# Patient Record
(demographics unavailable — no encounter records)

---

## 2024-10-15 NOTE — PHYSICAL EXAM
[Normal muscle bulk without asymmetry] : normal muscle bulk without asymmetry [Facet Tenderness] : facet tenderness [Spine: Flexion to ___ degrees, without pain] : spine: flexion to [unfilled] degrees, without pain [Normal] : Gait: normal [] : Motor:

## 2024-10-15 NOTE — HISTORY OF PRESENT ILLNESS
[Back Pain] : back pain [___ yrs] : [unfilled] year(s) ago [Episodic] : episodic [8] : a maximum pain level of 8/10 [Aching] : aching [Transitioning] : transitioning [Medications] : medications [Rest] : rest [FreeTextEntry1] : Patient was provided option of utilizing  services, but patient declined and would like to proceed with visit by using interpretation assistance from family member/staff.   HPI     Ms. ORLANDO RENDON is a 74 year F on baby ASA with pmhx of breast cancer (2022- s/p lumpectomy and hormone therapy). HTN, HLD. Complaints of worsening lower back pain with transitions. Pain worst in the morning. PRN Tylenol with no effect. Does not wish to take stronger medications. Pain is impacting her quality of life and ability to perform ADL's. Denies any additional weakness, numbness, bowel/bladder dysfunction, history of bleeding disorders.    Previous and current pain medications/doses/effects: Tylenol      Previous Pain Treatments:     Previous Pain Injections:     Previous Diagnostic Studies/Images: 8/13/2024 View Order (Report matches exam selected in Patient History pane)  PROCEDURE: MRI LUMBAR SPINE WAW IC  ORDER #: [id number] CC: ; ; ; End of cc's  CLINICAL INFORMATION: Back pain, breast cancer  ADDITIONAL CLINICAL INFORMATION: Cancer C80.1  TECHNIQUE: Multiplanar, multisequence MRI was performed of the lumbar spine. IV Contrast: Gadavist 7 cc administered 3 cc discarded  PRIOR STUDIES: No priors available for comparison.  FINDINGS:  LOCALIZER: No additional findings. BONES: There is no fracture or bone marrow edema. There is no marrow replacing process or abnormal enhancement within the lumbar spine to suggest sequelae of metastatic disease. ALIGNMENT: The alignment is normal. SACROILIAC JOINTS/SACRUM: There is no sacral fracture. The SI joints are partially visualized but are intact. CONUS AND CAUDA EQUINA: The distal cord and conus are normal in signal. Conus terminates at L1. VISUALIZED INTRAPELVIC/INTRA-ABDOMINAL SOFT TISSUES: Normal. PARASPINAL SOFT TISSUES: Normal.   INDIVIDUAL LEVELS:  LOWER THORACIC SPINE: No spinal canal or neuroforaminal stenosis.  L1-L2: Mild bilateral facet arthropathy. No spinal canal or neuroforaminal stenosis. L2-L3: Mild bilateral facet arthropathy. No spinal canal or neuroforaminal stenosis. L3-L4: Small posterior disc bulge and moderate bilateral facet arthropathy result in mild bilateral neural foraminal narrowing but no significant central canal narrowing. L4-L5: Minimal posterior disc bulge and moderate bilateral facet arthropathy result in mild bilateral neural foraminal narrowing but no significant central canal narrowing. L5-S1: Posterior osseous ridging and moderate bilateral facet arthropathy result in mild bilateral neural foraminal narrowing but no significant central canal narrowing.   IMPRESSION:  No MRI evidence for metastatic disease to the lumbar spine.  Multilevel discogenic degenerative disease and facet arthropathy of the lumbar spine, most pronounced at L3-L4, L4-L5 and L5-S1 where there is mild bilateral neural foraminal narrowing. No additional areas of significant central canal or neural foraminal narrowing with lumbar spine.   ====================================================================================== 8/4/2024 View Order (Report matches exam selected in Patient History pane)  PROCEDURE: CT LUMBAR SPINE  ORDER #: [id number] CC: ; ; ; End of cc's  EXAMINATION: CT LUMBAR SPINE  CLINICAL INDICATION: back pain TECHNIQUE: Thin section CT images were obtained without contrast. Coronal and sagittal reconstructions were performed. Dose reduction techniques were utilized including but not limited to automated exposure control (AEC), iterative reconstruction technique, and/or mA and/or kV dose adjustments based on patient size. COMPARISON: Abdomen and pelvis CT 1/27/2024.  FINDINGS:  There is bridging osteophyte at L5-S1, with associated ankylosis across the disc space. There is diffuse osteopenia, with mild discogenic endplate marrow changes at L1-2. There is a broad-based disc bulge with mild soft tissue encroachment on the central canal and neural foramina at L4-5. There is mild to moderate osteophytic encroachment on the left L5-S1 foramen.  There are degenerative changes in the sacroiliac joints. There are calyceal tip stones in the mid left kidney. There is a calcified granuloma and diffuse fatty replacement in the right lobe of liver. All of these findings are stable.  No acute fractures identified. No prevertebral soft tissue swelling. The facets show no evidence of dislocation or perching. No evidence of a hematoma or edema in the paraspinal musculature and soft tissues. Vertebral body height is intact throughout. MRI would be more sensitive to soft tissue encroachment on neural elements, ligamentous injury, subdural/epidural hemorrhage, and cord contusion.   IMPRESSION:  1. No acute fractures identified.       [FreeTextEntry7] : lower back pain

## 2024-10-15 NOTE — ASSESSMENT
[FreeTextEntry1] : >> Imaging and Other Studies   I personally reviewed the relevant imaging.  Discussed and explained to patient the likely source of pathology and pain.  Questions answered. MRI CT  >> Therapy and Other Modalities  PT   >> Medications  acetaminophen 650mg q8h prn pain (caution <3g daily)   >> Interventions   Significant component of axial back pain secondary to lumbar spondylosis and facet arthropathy demonstrated on MRI LS without significant alternative pertinent findings, PE exam findings of + facet tenderness and facet loading pain.  Pain refractory to conservative treatments.  Will schedule BILATERAL L4-sacral ala diagnostic medial branch block (2 joints, 3 nerves on each side) r/b/a discussed   May consider radiofreqency ablation  >> Consults   na  >> Discussion of Risks/Benefits/Alternatives    >Regarding any scheduled procedures:   In the event the patient is scheduled for a procedure,   I have discussed in detail with the patient that any interventional pain procedure is associated with potential risks.  The procedure may include an injection of steroids and potentially other medications (local anesthetic and normal saline) into the epidural space or surrounding tissue of the spine.  There are significant risks of this procedure which include and are not limited to infection, bleeding, worsening pain, dural puncture leading to postdural puncture headache, nerve damage, spinal cord injury, paralysis, stroke, and death.   There is a chance that the procedure does not improve their pain.   There are risks associated with the steroid being absorbed into the body systemically.  These include dysphoria, difficulty sleeping, mood swings and personality changes.  Premenopausal women may notice an irregularity in her menstrual cycle for 2-3 months following the injection.  Steroids can specifically affect patients with hypertension, diabetes, and peptic ulcers.  The procedure may cause a temporary increase in blood pressure and blood pressure, and may adversely affect a peptic ulcer.  Other, more rare complications, include avascular necrosis of joints, glaucoma and worsening of osteoporosis.   I have discussed the risks of the procedure at length with the patient, and the potential benefits of pain relief.  I have offered alternatives to the procedure.  All questions were answered.   The patient expressed understanding and wishes to proceed with the procedure.   > Longitudinal management of Complex Painful condition   The patient is being managed for a complex condition that requires ongoing management.  The nature of this condition demands nuanced approach to treatment.  The seriousness of the condition necessitates an in-depth and focused approach to management and coordination with other healthcare professionals.    This visit involves intricate evaluation and management of the patient's condition.  The complexity of the visit was due to the need for detailed assessment of the current state, consideration of potential complications and a careful balancing of treatment options to management the chronic condition effectively.   As detailed above, the patient has a chronic significant painful condition that requires regular and detailed management.  The condition's impact on the patient's quality of life and health is substantial and necessitates a comprehensive and tailored approach   >> Conclusion   There were no barriers to communication. Informed patient that I would be available for any additional questions. Patient was instructed to call with any worsening symptoms including severe pain, new numbness/weakness, or changes in the bowel/bladder function. Discussed role of nsaids in pain management and all relevant risks, if patient is continuing to require after 4 weeks the patient should f/u for alternative treatment. Instructed patient to maintain pain diary to monitor pain level, mobility, and function.   The referring provider was informed of the above diagnosis and treatment plan.

## 2024-10-25 NOTE — HISTORY OF PRESENT ILLNESS
[Patient reported hearing was abnormal] : Patient reported hearing was abnormal [Patient reported vision is normal] : Patient reported vision is normal [Patient reported Patient reported dental screening is normal] : Patient reported dental screening is normal [Patient reported colon/rectal/cancer screening was normal] : Patient reported colon/rectal cancer screening was normal [Patient reported breast cancer screening was normal] : Patient reported breast cancer screening was normal [FreeTextEntry1] : Patient is a 74-year-old female with past medical history of right-sided breast cancer, low back pain, hypertension, hyperlipidemia, erythrocytosis, osteopenia, type 2 diabetes mellitus, hypothyroidism Patient only speaks [place]ian 10/25/2024 High Blood Pressure - increased herself amlodipine to 10MG, which has been taken since Monday and has helped to lower blood pressure. - Blood pressure still on the higher side, but has been averaging 139/70 at home.  Diabetes - Has been taking metformin ER, 2 pills once a day. - Experiencing constipation, which is unusual as metformin usually causes diarrhea. - A1C was 7.1 last year but has increased to 8.4 this year, reason unknown.  Fatty Liver - Ultrasound in August showed echogenic liver indicating steatosis.  Back Pain - Physical therapy was stopped due to increased pain after sessions.  Misc - Forgot to bring blood pressure readings. - Plans to travel to [facility] for six months in December. - Bone density scan already completed. - No current leg swelling, which was a previous issue with amlodipine.   [facility] and [facility] 10:30pm sept 3rd. Not sure which doctor.  Will confirm by calling back.  #Osteoporosis Got prolia yesterday  # Hypertension on amlodipine 5mg and chlorthalidone 25mg qd. could not cut the chlorthalidone pill in half. also stopped amlodipine instead of reducing it to 5mg.   #Hearing loss need new audiology referral  #Sleep apnea-like behavior sleep study pending  08/08/2024 ORLANDO RENDON is a 74 year yo Other race  female is coming in today to establish care. Patient has PMHx as listed below [place]  #Breast Ca right anastrazole; s/p lumpectomy wiht LN removal  #Low back pain MRI low back due to constant back pain sunday ER visit showed kidney infection. got abx. has for 6 more days.   #HTN on amlodipine 10mg QD.   #erythrocytosis monitored by heme  #HLD will get fasting lipid profile cont atorvastatin 10mg QHS  #Osteopenia due for BD scan  #T2DM on metformin 850mg QD  Microvascular complications: Nephropathy : no Neuropathy: no microfilament exam Retinopathy : eye doc in [facility]  Macrovascular complications:  HTN: HTN CAD/CVA: no  Lipids: yes   #Hyperthyroidism on methimazole   Education: middle school Work: house keeper retired ETOH/Smoking: remote smoking; on occasion alcohol Weight loss/malnutrition: 148lb stable : yes; 4 kids Immunization: yes Screening: done Depression screening: done Medication reconciliation: done Allergies: done     4M Geriatric Screening Tests   Based on The 4Ms While Caring for Older Adults, an evidence-based focus on the key areas of mentation, mobility, medications, and what matters most (a guide by the [place] [employer] and the [employer])     Medications: -Anticholinergic burden:[ ]     Mobility:   -Chronic pain: tylenol -Constipation: no -Urinary symptoms: no; was going to bathroom alot before taking abx     Frail Scale: 0/5   -Are you fatigued?[ ] -Can you walk up one flight of stairs?[ ] -Can you walk one block?[ ] -Do you have more than 5 illnesses?[ ] -Have you lost more than 5% of your weight in last 6 months?[ ]       Mentation: -Hx of dementia:[ ] -h/o delirium:[ ] -Cognitive enhancing agents:[ ]       Sleep: 7-8 hrs STOP-BANG Snoring:[ ] Tiredness:[ ] Observed Apnea:[ ] Pressure: High blood pressure[ ] BMI: higher than 35.[ ] Age: older than 50[ ] Neck Circumference: greater than 16 inches is considered a risk factor.[ ] Gender: Males[ ]     Matters Most     [BreastSonogramDate] : 10/22 [TextBox_25] : osteopenia -2.3T; due [TextBox_31] : left hearing loss;  [Mammogramdate] : 11/2023 [TextBox_19] : 6-12month f/u needs GI follow up [FreeTextEntry3] : 11/23

## 2024-11-19 NOTE — ASSESSMENT
[FreeTextEntry1] : >> Imaging and Other Studies   I personally reviewed the relevant imaging.  Discussed and explained to patient the likely source of pathology and pain.  Questions answered. MRI CT  >> Therapy and Other Modalities  PT   >> Medications  acetaminophen 650mg q8h prn pain (caution <3g daily)   >> Interventions   Significant component of axial back pain secondary to lumbar spondylosis and facet arthropathy demonstrated on MRI LS without significant alternative pertinent findings, PE exam findings of + facet tenderness and facet loading pain.  Pain refractory to conservative treatments.  sp BILATERAL L4-sacral ala diagnostic medial branch block (2 joints, 3 nerves on each side) with 12 hours of improvement 100%  given efficacy of previous intervention that is >80% improvement in pain and improved ability to perform adls, and return of pain despite conservative treatment, will schedule repeat BILATERAL L4-sacral ala diagnostic medial branch block (2 joints, 3 nerves on each side)    May consider radiofreqency ablation  >> Consults   na  >> Discussion of Risks/Benefits/Alternatives    >Regarding any scheduled procedures:   In the event the patient is scheduled for a procedure,   I have discussed in detail with the patient that any interventional pain procedure is associated with potential risks.  The procedure may include an injection of steroids and potentially other medications (local anesthetic and normal saline) into the epidural space or surrounding tissue of the spine.  There are significant risks of this procedure which include and are not limited to infection, bleeding, worsening pain, dural puncture leading to postdural puncture headache, nerve damage, spinal cord injury, paralysis, stroke, and death.   There is a chance that the procedure does not improve their pain.   There are risks associated with the steroid being absorbed into the body systemically.  These include dysphoria, difficulty sleeping, mood swings and personality changes.  Premenopausal women may notice an irregularity in her menstrual cycle for 2-3 months following the injection.  Steroids can specifically affect patients with hypertension, diabetes, and peptic ulcers.  The procedure may cause a temporary increase in blood pressure and blood pressure, and may adversely affect a peptic ulcer.  Other, more rare complications, include avascular necrosis of joints, glaucoma and worsening of osteoporosis.   I have discussed the risks of the procedure at length with the patient, and the potential benefits of pain relief.  I have offered alternatives to the procedure.  All questions were answered.   The patient expressed understanding and wishes to proceed with the procedure.   > Longitudinal management of Complex Painful condition   The patient is being managed for a complex condition that requires ongoing management.  The nature of this condition demands nuanced approach to treatment.  The seriousness of the condition necessitates an in-depth and focused approach to management and coordination with other healthcare professionals.    This visit involves intricate evaluation and management of the patient's condition.  The complexity of the visit was due to the need for detailed assessment of the current state, consideration of potential complications and a careful balancing of treatment options to management the chronic condition effectively.   As detailed above, the patient has a chronic significant painful condition that requires regular and detailed management.  The condition's impact on the patient's quality of life and health is substantial and necessitates a comprehensive and tailored approach   >> Conclusion   There were no barriers to communication. Informed patient that I would be available for any additional questions. Patient was instructed to call with any worsening symptoms including severe pain, new numbness/weakness, or changes in the bowel/bladder function. Discussed role of nsaids in pain management and all relevant risks, if patient is continuing to require after 4 weeks the patient should f/u for alternative treatment. Instructed patient to maintain pain diary to monitor pain level, mobility, and function.   The referring provider was informed of the above diagnosis and treatment plan.

## 2024-11-19 NOTE — PHYSICAL EXAM
[Normal] : Well developed, in no acute distress, alert and oriented to person, place and time [de-identified] :  Constitutional: Normal, well developed, no acute distress on audio/video examination Eyes: Symmetric, External structures on video examination ENT: Lips, mucosa and tongue normal on video examination Oropharynx: Lips normal, symmetric, no external lesions appreciated appreciated on video examination Respiratory: Non-labored breathing, no audible wheezes appreciated on audio/video examination Vascular: No cyanosis appreciated or edema appreciated on video examination GI:  no jaundice appreciated on video examination Neurovascular: CN grossly intact on video/audio examination, alert MSK: Normal muscle bulk on video examination

## 2024-11-19 NOTE — HISTORY OF PRESENT ILLNESS
[Back Pain] : back pain [___ yrs] : [unfilled] year(s) ago [Episodic] : episodic [8] : a maximum pain level of 8/10 [Aching] : aching [Transitioning] : transitioning [Medications] : medications [Rest] : rest [Home] : at home, [unfilled] , at the time of the visit. [Medical Office: ([facility])___] : at the medical office located in  [Verbal consent obtained from patient] : the patient, [unfilled] [FreeTextEntry1] : Patient was provided option of utilizing  services, but patient declined and would like to proceed with visit by using interpretation assistance from family member/staff.  Interval Note: sp  BILATERAL L4-sacral ala diagnostic medial branch block (2 joints, 3 nerves on each side) 11/15/24 100% improvement in pain for 12 hours. Unfortunately reports that the pain has returned over back.  R>L. Axial, worse with transition. Since last visit the pain is improved. Denies any additional weakness, numbness, bowel/bladder dysfunction.   HPI     Ms. ORLANDO RENDON is a 74 year F on baby ASA with pmhx of breast cancer (2022- s/p lumpectomy and hormone therapy). HTN, HLD. Complaints of worsening lower back pain with transitions. Pain worst in the morning. PRN Tylenol with no effect. Does not wish to take stronger medications. Pain is impacting her quality of life and ability to perform ADL's. Denies any additional weakness, numbness, bowel/bladder dysfunction, history of bleeding disorders.    Previous and current pain medications/doses/effects: Tylenol      Previous Pain Treatments:     Previous Pain Injections:   BILATERAL L4-sacral ala diagnostic medial branch block (2 joints, 3 nerves on each side) 11/15/24  Previous Diagnostic Studies/Images: 8/13/2024 View Order (Report matches exam selected in Patient History pane)  PROCEDURE: MRI LUMBAR SPINE WAW IC  ORDER #: [id number] CC: ; ; ; End of cc's  CLINICAL INFORMATION: Back pain, breast cancer  ADDITIONAL CLINICAL INFORMATION: Cancer C80.1  TECHNIQUE: Multiplanar, multisequence MRI was performed of the lumbar spine. IV Contrast: Gadavist 7 cc administered 3 cc discarded  PRIOR STUDIES: No priors available for comparison.  FINDINGS:  LOCALIZER: No additional findings. BONES: There is no fracture or bone marrow edema. There is no marrow replacing process or abnormal enhancement within the lumbar spine to suggest sequelae of metastatic disease. ALIGNMENT: The alignment is normal. SACROILIAC JOINTS/SACRUM: There is no sacral fracture. The SI joints are partially visualized but are intact. CONUS AND CAUDA EQUINA: The distal cord and conus are normal in signal. Conus terminates at L1. VISUALIZED INTRAPELVIC/INTRA-ABDOMINAL SOFT TISSUES: Normal. PARASPINAL SOFT TISSUES: Normal.   INDIVIDUAL LEVELS:  LOWER THORACIC SPINE: No spinal canal or neuroforaminal stenosis.  L1-L2: Mild bilateral facet arthropathy. No spinal canal or neuroforaminal stenosis. L2-L3: Mild bilateral facet arthropathy. No spinal canal or neuroforaminal stenosis. L3-L4: Small posterior disc bulge and moderate bilateral facet arthropathy result in mild bilateral neural foraminal narrowing but no significant central canal narrowing. L4-L5: Minimal posterior disc bulge and moderate bilateral facet arthropathy result in mild bilateral neural foraminal narrowing but no significant central canal narrowing. L5-S1: Posterior osseous ridging and moderate bilateral facet arthropathy result in mild bilateral neural foraminal narrowing but no significant central canal narrowing.   IMPRESSION:  No MRI evidence for metastatic disease to the lumbar spine.  Multilevel discogenic degenerative disease and facet arthropathy of the lumbar spine, most pronounced at L3-L4, L4-L5 and L5-S1 where there is mild bilateral neural foraminal narrowing. No additional areas of significant central canal or neural foraminal narrowing with lumbar spine.   ====================================================================================== 8/4/2024 View Order (Report matches exam selected in Patient History pane)  PROCEDURE: CT LUMBAR SPINE  ORDER #: [id number] CC: ; ; ; End of cc's  EXAMINATION: CT LUMBAR SPINE  CLINICAL INDICATION: back pain TECHNIQUE: Thin section CT images were obtained without contrast. Coronal and sagittal reconstructions were performed. Dose reduction techniques were utilized including but not limited to automated exposure control (AEC), iterative reconstruction technique, and/or mA and/or kV dose adjustments based on patient size. COMPARISON: Abdomen and pelvis CT 1/27/2024.  FINDINGS:  There is bridging osteophyte at L5-S1, with associated ankylosis across the disc space. There is diffuse osteopenia, with mild discogenic endplate marrow changes at L1-2. There is a broad-based disc bulge with mild soft tissue encroachment on the central canal and neural foramina at L4-5. There is mild to moderate osteophytic encroachment on the left L5-S1 foramen.  There are degenerative changes in the sacroiliac joints. There are calyceal tip stones in the mid left kidney. There is a calcified granuloma and diffuse fatty replacement in the right lobe of liver. All of these findings are stable.  No acute fractures identified. No prevertebral soft tissue swelling. The facets show no evidence of dislocation or perching. No evidence of a hematoma or edema in the paraspinal musculature and soft tissues. Vertebral body height is intact throughout. MRI would be more sensitive to soft tissue encroachment on neural elements, ligamentous injury, subdural/epidural hemorrhage, and cord contusion.   IMPRESSION:  1. No acute fractures identified.       [FreeTextEntry7] : lower back pain

## 2024-12-06 NOTE — ASSESSMENT
[FreeTextEntry1] : Ms. Mauricio is a 74-year-old postmenopausal woman with PMH hyperthyroidism, HTN, HLD, DM that presents for initial consultation for newly diagnosed ER/DE+ HER2 - breast ca.  #  Moderately differentiated invasive ductal carcinoma measuring 10 mm that is ER/DE positive HER2 FISH negative with a Ki-67 of 10 to 15%. - Reviewed the diagnosis, prognosis and natural history of ER/DE+/HER2- IDC - Reviewed the imaging and path - Reviewed the NCCN guidelines and patient expresses understanding - Mammaprint low risk - no need for chemo - Discussed that she should meet Dr. Devlin again to discuss role of RT after surgery. - We did discuss I would recommend an aromatase inhibitor, Anastrozole, 1mg PO q day for a minimum of 5 years given that her tumor is ER/DE+ and she is post menopausal. We have reviewed the side effects of Anastrozole to include but are not limited to hot flashes, mood swings, arthralgias, bone pain, thinning of the bones including osteopenia/osteoporosis. She will take this with Ca++ 1200 mg PO q day and Vit D 800 IU daily to protect her bone health. - She is now s/p eval with Dr. Devlin with decision not to proceed with RT. Note reviewed. - Started Anastrazole 1mg daily 2/2023 tolerating well without complaints - s/p follow up with Dr. Swann today 5/9/23 seeing again 6/24 but daughter says she is going to pushout. Mammogram/sonogram-11/2023 reviewed - no evidence. need repeat 11/24. - Continue to monitor blood work. vs and CBC reviewed today; WBC 6.64, hgn 5.8, plt 193 - Continue follow up q3 mos with breast examinations. Of note patient will be travelling to [facility] 4/2024 - 10/2024. She will follow up after she returns. Advised to call office immediately with any new or worsening s/s 8/20/24 - vs and labs reviewed. labs drawn in office today. wbc, hgb, plts wnl, LFTs wnl, kidney function wnl. electrolytes wnl. elevated sugar.  Mammo/sono - 11/24.Seeing Dr. Swann in 9/10/24. MRI L spine - no evidence of malignancy. DEXA with Osteopenia - will proceed with prolia today.  12/2024 vs and labs reviewed; Continue anastrazole s/p mammo/sono 11/11/24 BIRADS 2. Continue to follow with Dr. swann. goes to [facility] for half the year will be back 7/2025. Follow then   #Osteopenia - s/p DEXA 10/2022 revealing osteopenia L spine -2.1, L femoral neck -2.3, L hip -1.8continue ca++ and vit D - Re-advised to continue weight bearing exercise, ca++ and vit D, limit alcohol and maintain healthy BMI - Re-reviewed possibly starting prolia or zometa for bone health. Reviewed side effects and logistics. Of note she is going to [facility] 5/2023 - 10/2023 and will be having dental work. Warned of risk of ONJ and will need to start after clearance by dentist. - 11/14/23 patient was seen by dentist in [facility] and completed dental work. She states she was cleared to start prolia and presents with letter in from dentist in [facility] which will need to be professionally medically translated. Given information to have this done prior to starting Prolia 3/21/24 - due for repeat dexa 10/24. cleared by dentist for prolia - daughter wants it given when she returns from [facility] so she can be monitored 8/20/24 -  DEXA with Osteopenia - will proceed with prolia today. had dental clearance. repeat in 1 year given concern for worsening bone density with anastrozole. rechecking 8/25 12/2204 she will miss next prolia as she will be in [facility] given she is in [facility] for half the year consider switching to reclast at next visit repeat DEXA 8/2025   #HLD - Continue Statin - now following with Dr. Alexandra  #HTN -changed from amlodipine to HCTZ - monitoring bp  #Colitis - s/p colitis episode requiring hospital admission for IV antibiotics at [facility] 4/20/23 - 4/22/23 - s/p follow up with Dr. Lee. Note reviewed 5/8/23. Scheduled for CNY 11/2023. - s/p CNY 11/6/23 with Dr. Lee  -need repeat  #erythrocytosis has not been dx with sleep apnea but does snore EPO wnl, NAMAN 2 - neg 12/2024 CBC was not drawn/lab error today does not want to be drawn again having physcal this week with Dr. Alexandra will get results   #Low back pain  MRI L spine - degenterative disc disease. bilateral neural foraminal narrowing. seeing spine institute seeing dr loyd for injection   Would like to follow 3-4 months with CBC with diff, CMP vit D, patient goes to [facility] for half year and will be back 7/2025 - appt then   Translation and visit provided by patient's daughter in German  patient goes to [facility] for half year and will be back 7/2025 - appt then

## 2024-12-06 NOTE — PHYSICAL EXAM
[Normal] : Well developed, in no acute distress, alert and oriented to person, place and time [Normal muscle bulk without asymmetry] : normal muscle bulk without asymmetry [de-identified] :  Constitutional: Normal, well developed, no acute distress on audio/video examination Eyes: Symmetric, External structures on video examination ENT: Lips, mucosa and tongue normal on video examination Oropharynx: Lips normal, symmetric, no external lesions appreciated appreciated on video examination Respiratory: Non-labored breathing, no audible wheezes appreciated on audio/video examination Vascular: No cyanosis appreciated or edema appreciated on video examination GI:  no jaundice appreciated on video examination Neurovascular: CN grossly intact on video/audio examination, alert MSK: Normal muscle bulk on video examination

## 2024-12-06 NOTE — ASSESSMENT
[FreeTextEntry1] : Ms. Mauricio is a 74-year-old postmenopausal woman with PMH hyperthyroidism, HTN, HLD, DM that presents for initial consultation for newly diagnosed ER/WY+ HER2 - breast ca.  #  Moderately differentiated invasive ductal carcinoma measuring 10 mm that is ER/WY positive HER2 FISH negative with a Ki-67 of 10 to 15%. - Reviewed the diagnosis, prognosis and natural history of ER/WY+/HER2- IDC - Reviewed the imaging and path - Reviewed the NCCN guidelines and patient expresses understanding - Mammaprint low risk - no need for chemo - Discussed that she should meet Dr. Devlin again to discuss role of RT after surgery. - We did discuss I would recommend an aromatase inhibitor, Anastrozole, 1mg PO q day for a minimum of 5 years given that her tumor is ER/WY+ and she is post menopausal. We have reviewed the side effects of Anastrozole to include but are not limited to hot flashes, mood swings, arthralgias, bone pain, thinning of the bones including osteopenia/osteoporosis. She will take this with Ca++ 1200 mg PO q day and Vit D 800 IU daily to protect her bone health. - She is now s/p eval with Dr. Devlin with decision not to proceed with RT. Note reviewed. - Started Anastrazole 1mg daily 2/2023 tolerating well without complaints - s/p follow up with Dr. Swann today 5/9/23 seeing again 6/24 but daughter says she is going to pushout. Mammogram/sonogram-11/2023 reviewed - no evidence. need repeat 11/24. - Continue to monitor blood work. vs and CBC reviewed today; WBC 6.64, hgn 5.8, plt 193 - Continue follow up q3 mos with breast examinations. Of note patient will be travelling to [facility] 4/2024 - 10/2024. She will follow up after she returns. Advised to call office immediately with any new or worsening s/s 8/20/24 - vs and labs reviewed. labs drawn in office today. wbc, hgb, plts wnl, LFTs wnl, kidney function wnl. electrolytes wnl. elevated sugar.  Mammo/sono - 11/24.Seeing Dr. Swann in 9/10/24. MRI L spine - no evidence of malignancy. DEXA with Osteopenia - will proceed with prolia today.  12/2024 vs and labs reviewed; Continue anastrazole s/p mammo/sono 11/11/24 BIRADS 2. Continue to follow with Dr. swann. goes to [facility] for half the year will be back 7/2025. Follow then   #Osteopenia - s/p DEXA 10/2022 revealing osteopenia L spine -2.1, L femoral neck -2.3, L hip -1.8continue ca++ and vit D - Re-advised to continue weight bearing exercise, ca++ and vit D, limit alcohol and maintain healthy BMI - Re-reviewed possibly starting prolia or zometa for bone health. Reviewed side effects and logistics. Of note she is going to [facility] 5/2023 - 10/2023 and will be having dental work. Warned of risk of ONJ and will need to start after clearance by dentist. - 11/14/23 patient was seen by dentist in [facility] and completed dental work. She states she was cleared to start prolia and presents with letter in from dentist in [facility] which will need to be professionally medically translated. Given information to have this done prior to starting Prolia 3/21/24 - due for repeat dexa 10/24. cleared by dentist for prolia - daughter wants it given when she returns from [facility] so she can be monitored 8/20/24 -  DEXA with Osteopenia - will proceed with prolia today. had dental clearance. repeat in 1 year given concern for worsening bone density with anastrozole. rechecking 8/25 12/2204 she will miss next prolia as she will be in [facility] given she is in [facility] for half the year consider switching to reclast at next visit repeat DEXA 8/2025   #HLD - Continue Statin - now following with Dr. Alexandra  #HTN -changed from amlodipine to HCTZ - monitoring bp  #Colitis - s/p colitis episode requiring hospital admission for IV antibiotics at [facility] 4/20/23 - 4/22/23 - s/p follow up with Dr. Lee. Note reviewed 5/8/23. Scheduled for CNY 11/2023. - s/p CNY 11/6/23 with Dr. Lee  -need repeat  #erythrocytosis has not been dx with sleep apnea but does snore EPO wnl, NAMAN 2 - neg 12/2024 CBC was not drawn/lab error today does not want to be drawn again having physcal this week with Dr. Alexandra will get results   #Low back pain  MRI L spine - degenterative disc disease. bilateral neural foraminal narrowing. seeing spine institute seeing dr loyd for injection   Would like to follow 3-4 months with CBC with diff, CMP vit D, patient goes to [facility] for half year and will be back 7/2025 - appt then   Translation and visit provided by patient's daughter in Latvian  patient goes to [facility] for half year and will be back 7/2025 - appt then

## 2024-12-06 NOTE — PHYSICAL EXAM
[Fully active, able to carry on all pre-disease performance without restriction] : Status 0 - Fully active, able to carry on all pre-disease performance without restriction [Normal] : affect appropriate [de-identified] : R breast density R breast lumpectomy and SLNbx scar well healed. No masses or LAD bilaterally, s/p breast exam last week with

## 2024-12-06 NOTE — ASSESSMENT
[FreeTextEntry1] : >> Imaging and Other Studies   I personally reviewed the relevant imaging.  Discussed and explained to patient the likely source of pathology and pain.  Questions answered. MRI CT  >> Therapy and Other Modalities  PT   >> Medications  acetaminophen 650mg q8h prn pain (caution <3g daily)   >> Interventions   Significant component of axial back pain secondary to lumbar spondylosis and facet arthropathy demonstrated on MRI LS without significant alternative pertinent findings, PE exam findings of + facet tenderness and facet loading pain.  Pain refractory to conservative treatments.  sp BILATERAL L4-sacral ala diagnostic medial branch block (2 joints, 3 nerves on each side) with 12 hours of improvement 100% x 2  Given significant relief from diagnostic lumbar medial branch block of >80%, and significant improvement in function (as measured by ELIAS) and continued lumbar facet arthropathy pain (demonstrated on imaging) and axial back pain, will schedule LEFT AND RIGHT  L4-sacral ala medial branch (2 joints, 3 nerves on each side) radiofrequency ablation at 80C for 2:30 min r/b/a discussed   >> Consults   na  >> Discussion of Risks/Benefits/Alternatives    >Regarding any scheduled procedures:   In the event the patient is scheduled for a procedure,   I have discussed in detail with the patient that any interventional pain procedure is associated with potential risks.  The procedure may include an injection of steroids and potentially other medications (local anesthetic and normal saline) into the epidural space or surrounding tissue of the spine.  There are significant risks of this procedure which include and are not limited to infection, bleeding, worsening pain, dural puncture leading to postdural puncture headache, nerve damage, spinal cord injury, paralysis, stroke, and death.   There is a chance that the procedure does not improve their pain.   There are risks associated with the steroid being absorbed into the body systemically.  These include dysphoria, difficulty sleeping, mood swings and personality changes.  Premenopausal women may notice an irregularity in her menstrual cycle for 2-3 months following the injection.  Steroids can specifically affect patients with hypertension, diabetes, and peptic ulcers.  The procedure may cause a temporary increase in blood pressure and blood pressure, and may adversely affect a peptic ulcer.  Other, more rare complications, include avascular necrosis of joints, glaucoma and worsening of osteoporosis.   I have discussed the risks of the procedure at length with the patient, and the potential benefits of pain relief.  I have offered alternatives to the procedure.  All questions were answered.   The patient expressed understanding and wishes to proceed with the procedure.   > Longitudinal management of Complex Painful condition   The patient is being managed for a complex condition that requires ongoing management.  The nature of this condition demands nuanced approach to treatment.  The seriousness of the condition necessitates an in-depth and focused approach to management and coordination with other healthcare professionals.    This visit involves intricate evaluation and management of the patient's condition.  The complexity of the visit was due to the need for detailed assessment of the current state, consideration of potential complications and a careful balancing of treatment options to management the chronic condition effectively.   As detailed above, the patient has a chronic significant painful condition that requires regular and detailed management.  The condition's impact on the patient's quality of life and health is substantial and necessitates a comprehensive and tailored approach   >> Conclusion   There were no barriers to communication. Informed patient that I would be available for any additional questions. Patient was instructed to call with any worsening symptoms including severe pain, new numbness/weakness, or changes in the bowel/bladder function. Discussed role of nsaids in pain management and all relevant risks, if patient is continuing to require after 4 weeks the patient should f/u for alternative treatment. Instructed patient to maintain pain diary to monitor pain level, mobility, and function.   I explained to patient benefits and limitation of TeleMedicine visits  Patient understands that limitations include inability to perform comprehensive physical exam, which may lead to potential diagnostic inconsistencies.    Any scheduled procedures are based on history, imaging and limited physical exam performed on TeleHealth visit.  If necessary, additional focal physical exam will be performed on date of procedure  Patient understands that diagnosis and treatment may be limited by these inconsistencies and patient agrees to proceed with care plan

## 2024-12-06 NOTE — PHYSICAL EXAM
[Fully active, able to carry on all pre-disease performance without restriction] : Status 0 - Fully active, able to carry on all pre-disease performance without restriction [Normal] : affect appropriate [de-identified] : R breast density R breast lumpectomy and SLNbx scar well healed. No masses or LAD bilaterally, s/p breast exam last week with

## 2024-12-06 NOTE — HISTORY OF PRESENT ILLNESS
[Back Pain] : back pain [___ yrs] : [unfilled] year(s) ago [Episodic] : episodic [8] : a maximum pain level of 8/10 [Aching] : aching [Transitioning] : transitioning [Medications] : medications [Rest] : rest [Home] : at home, [unfilled] , at the time of the visit. [Medical Office: ([facility])___] : at the medical office located in  [Verbal consent obtained from patient] : the patient, [unfilled] [FreeTextEntry1] : Patient was provided option of utilizing  services, but patient declined and would like to proceed with visit by using interpretation assistance from family member/staff.  Interval Note:  sp  BILATERAL L4-sacral ala diagnostic medial branch block (2 joints, 3 nerves on each side) 100% improvement in pain for 12 hours.  Since last visit the pain is improved. Denies any additional weakness, numbness, bowel/bladder dysfunction.   HPI     Ms. ORLANDO RENDON is a 74 year F on baby ASA with pmhx of breast cancer (2022- s/p lumpectomy and hormone therapy). HTN, HLD. Complaints of worsening lower back pain with transitions. Pain worst in the morning. PRN Tylenol with no effect. Does not wish to take stronger medications. Pain is impacting her quality of life and ability to perform ADL's. Denies any additional weakness, numbness, bowel/bladder dysfunction, history of bleeding disorders.    Previous and current pain medications/doses/effects: Tylenol      Previous Pain Treatments:     Previous Pain Injections:  BILATERAL L4-sacral ala diagnostic medial branch block (2 joints, 3 nerves on each side) 12/4/24  BILATERAL L4-sacral ala diagnostic medial branch block (2 joints, 3 nerves on each side) 11/15/24  Previous Diagnostic Studies/Images: 8/13/2024 View Order (Report matches exam selected in Patient History pane)  PROCEDURE: MRI LUMBAR SPINE WAW IC  ORDER #: [id number] CC: ; ; ; End of cc's  CLINICAL INFORMATION: Back pain, breast cancer  ADDITIONAL CLINICAL INFORMATION: Cancer C80.1  TECHNIQUE: Multiplanar, multisequence MRI was performed of the lumbar spine. IV Contrast: Gadavist 7 cc administered 3 cc discarded  PRIOR STUDIES: No priors available for comparison.  FINDINGS:  LOCALIZER: No additional findings. BONES: There is no fracture or bone marrow edema. There is no marrow replacing process or abnormal enhancement within the lumbar spine to suggest sequelae of metastatic disease. ALIGNMENT: The alignment is normal. SACROILIAC JOINTS/SACRUM: There is no sacral fracture. The SI joints are partially visualized but are intact. CONUS AND CAUDA EQUINA: The distal cord and conus are normal in signal. Conus terminates at L1. VISUALIZED INTRAPELVIC/INTRA-ABDOMINAL SOFT TISSUES: Normal. PARASPINAL SOFT TISSUES: Normal.   INDIVIDUAL LEVELS:  LOWER THORACIC SPINE: No spinal canal or neuroforaminal stenosis.  L1-L2: Mild bilateral facet arthropathy. No spinal canal or neuroforaminal stenosis. L2-L3: Mild bilateral facet arthropathy. No spinal canal or neuroforaminal stenosis. L3-L4: Small posterior disc bulge and moderate bilateral facet arthropathy result in mild bilateral neural foraminal narrowing but no significant central canal narrowing. L4-L5: Minimal posterior disc bulge and moderate bilateral facet arthropathy result in mild bilateral neural foraminal narrowing but no significant central canal narrowing. L5-S1: Posterior osseous ridging and moderate bilateral facet arthropathy result in mild bilateral neural foraminal narrowing but no significant central canal narrowing.   IMPRESSION:  No MRI evidence for metastatic disease to the lumbar spine.  Multilevel discogenic degenerative disease and facet arthropathy of the lumbar spine, most pronounced at L3-L4, L4-L5 and L5-S1 where there is mild bilateral neural foraminal narrowing. No additional areas of significant central canal or neural foraminal narrowing with lumbar spine.   ====================================================================================== 8/4/2024 View Order (Report matches exam selected in Patient History pane)  PROCEDURE: CT LUMBAR SPINE  ORDER #: [id number] CC: ; ; ; End of cc's  EXAMINATION: CT LUMBAR SPINE  CLINICAL INDICATION: back pain TECHNIQUE: Thin section CT images were obtained without contrast. Coronal and sagittal reconstructions were performed. Dose reduction techniques were utilized including but not limited to automated exposure control (AEC), iterative reconstruction technique, and/or mA and/or kV dose adjustments based on patient size. COMPARISON: Abdomen and pelvis CT 1/27/2024.  FINDINGS:  There is bridging osteophyte at L5-S1, with associated ankylosis across the disc space. There is diffuse osteopenia, with mild discogenic endplate marrow changes at L1-2. There is a broad-based disc bulge with mild soft tissue encroachment on the central canal and neural foramina at L4-5. There is mild to moderate osteophytic encroachment on the left L5-S1 foramen.  There are degenerative changes in the sacroiliac joints. There are calyceal tip stones in the mid left kidney. There is a calcified granuloma and diffuse fatty replacement in the right lobe of liver. All of these findings are stable.  No acute fractures identified. No prevertebral soft tissue swelling. The facets show no evidence of dislocation or perching. No evidence of a hematoma or edema in the paraspinal musculature and soft tissues. Vertebral body height is intact throughout. MRI would be more sensitive to soft tissue encroachment on neural elements, ligamentous injury, subdural/epidural hemorrhage, and cord contusion.   IMPRESSION:  1. No acute fractures identified.       [FreeTextEntry7] : lower back pain

## 2024-12-06 NOTE — REASON FOR VISIT
[Follow-Up Visit] : a follow-up [Family Member] : family member [FreeTextEntry2] : Breast Cancer  [Interpreters_FullName] : Daja Morrissey [Interpreters_Relationshiptopatient] : Daughter  [TWNoteComboBox1] : [place] Asthma    Asthma is a condition in which the airways tighten and narrow, making it difficult to breath. Asthma episodes, also called asthma attacks, range from minor to life-threatening. Symptoms include wheezing, coughing, chest tightness, or shortness of breath. The diagnosis of asthma is made by a review of your medical history and a physical exam, but may involve additional testing. Asthma cannot be cured, but medicines and lifestyle changes can help control it. Avoid triggers of asthma which may include animal dander, pollen, mold, smoke, air pollutants, etc.     SEEK IMMEDIATE MEDICAL CARE IF YOU HAVE ANY OF THE FOLLOWING SYMPTOMS: worsening of symptoms, shortness of breath at rest, chest pain, bluish discoloration to lips or fingertips, lightheadedness/dizziness, or fever.    Chest Pain    Chest pain can be caused by many different conditions which may or may not be dangerous. Causes include heartburn, lung infections, heart attack, blood clot in lungs, skin infections, strain or damage to muscle, cartilage, or bones, etc. In addition to a history and physical examination, an electrocardiogram (ECG) or other lab tests may have been performed to determine the cause of your chest pain. Follow up with your primary care provider or with a cardiologist as instructed.     SEEK IMMEDIATE MEDICAL CARE IF YOU HAVE ANY OF THE FOLLOWING SYMPTOMS: worsening chest pain, coughing up blood, unexplained back/neck/jaw pain, severe abdominal pain, dizziness or lightheadedness, fainting, shortness of breath, sweaty or clammy skin, vomiting, or racing heart beat. These symptoms may represent a serious problem that is an emergency. Do not wait to see if the symptoms will go away. Get medical help right away. Call 911 and do not drive yourself to the hospital.    Follow-up with pulmonary and your sleep study as previously scheduled.

## 2024-12-06 NOTE — HISTORY OF PRESENT ILLNESS
[de-identified] : 72-year-old postmenopausal woman with PMH hyperthyroidism, HTN, HLD, DM that presents for initial consultation for newly diagnosed ER/MD+ HER2 - breast ca. \par  \par  Oncological History: \par  \par  hx of benign breast bx in past about 10 years ago R sided \par  \par  10/7/22 s/p a screening mammogram revealing focal asymmetry in the right breast. The patient will be recalled for further evaluation with additional mammographic views and possible targeted ultrasound of the right breast.\par  \par  22 s/p diagnostic mammo/sono which showed focal right breast asymmetry, spiculated suspicious mass seen at the right breast 10 o'clock position, 6 cm from the nipple on ultrasound as a 10 mm hypoechoic irregular mass with posterior shadowing, BI-RADS 4.  \par  \par  22 s/p right ultrasound-guided core biopsy which revealed a moderately differentiated invasive ductal carcinoma measuring 10 mm that is ER/MD positive HER2 FISH negative with a Ki-67 of 10 to 15%.\par   \par  Mammprint low risk \par  \par  22 s/p MRI revealing  Recently diagnosed right breast malignancy at right breast 10:00. 6 mm indeterminate enhancing mass in the right lower outer breast. MRI guided biopsy is recommended for further evaluation. No suspicious enhancement in the left breast.\par  \par  1/3/23 MRI guided core bx R path revealing fragment of radial sclerosing lesion with intraductal papillomas usual ductal hyperplasia, cystic apocrine metaplasia measuring <4mm in greatest dimension stromal fibrosis and microcysts\par  \par  23 s/p R lumpectomy pathology revealing: \par  Tumor Site: Clock position;  Distance from nipple (Centimeters) 6 cm\par  10 o'clock\par  Histologic Type: Invasive carcinoma of no special type (ductal)\par  Histologic Grade (Fiordaliza Histologic Score):\par  Glandular (Acinar) / Tubular Differentiation: Score 3\par  Nuclear Pleomorphism: Score 2\par  Mitotic Rate: Score 1\par  Overall Grade: Grade 2 (scores of 6 or 7)\par  Tumor Size: 10 Millimeters (mm) (based on prior biopsy; [id number])\par  Tumor Focality: Single focus of invasive carcinoma\par  Ductal Carcinoma In Situ (DCIS): Present\par  Tumor Size: 3 Millimeters (mm)\par  Number of Blocks with DCIS: 5\par  Number of Blocks Examined: 36\par  Architectural Patterns: Cribriform;  Solid and cribriform\par  Nuclear Grade: Grade I- II\par  Necrosis: Present, focal (small foci or single cell necrosis)\par  Lobular Carcinoma In Situ (LCIS): Not identified\par  Lymphovascular Invasion: Not identified\par  Dermal Lymphovascular Invasion: No skin present\par  Microcalcifications: Present in DCIS;  Present in non-neoplastic tissue\par  Treatment Effect in the Breast: No known presurgical therapy\par  Margin Status for Invasive Carcinoma: All margins negative for invasive carcinoma\par  Distance from Invasive Carcinoma to Closest Margin: Greater than 5 mm\par  Closest Margin(s) to Invasive Carcinoma: Superior; all other margins>10 mm\par  Margin Status for DCIS: All margins negative for DCIS\par  Distance from DCIS to Closest Margin: Greater than 5 mm\par  Closest Margin(s) to DCIS: Superior; all other margins>10 mm\par  Regional Lymph Node Status: All regional lymph nodes negative for tumor\par  Molecular Results on Lymph Nodes (used for pN staging): Molecular studies not\par  performed\par  Total Number of Lymph Nodes Examined (sentinel and non-sentinel): 4\par  Number of [place] Nodes Examined: 4\par  Pathologic Stage Classification (pTNM, AJCC 8th Edition)\par  pT Category: pT1b\par  Regional Lymph Nodes Modifier: (sn): [place] node(s) evaluated.\par  pN Category: pN0\par  Prior Biopsy ([facility] Standard 2.7)\par  A biopsy was performed in-house: [id number]\par  Special Studies\par  Estrogen Receptor (ER) Status: Positive (greater than 10% of cells demonstrate\par  nuclear positivity)\par  Percentage of Cells with Nuclear Positivity: %\par  Progesterone Receptor (PgR) Status: Positive\par  Percentage of Cells with Nuclear Positivity: %\par  HER2 (by immunohistochemistry): Equivocal (Score 2+)\par  HER2 (by in situ hybridization): Negative (not amplified)\par  Ki-67 Percentage of Positive Nuclei: 10-15%\par  \par  Breast Cancer Risk Factors:\par  Menarche: 12\par  Date of LMP: 38\par  Menopause: 38\par  \par  Age at first live birth: 16\par  Nursed: yes 3 mos for each birth \par  Hysterectomy: no\par  Oophorectomy: no\par  OCP: no\par  HRT: no\par  Last pap/pelvic exam: hasn't seen in 3 years \par  Related family history: mother esophageal diagnosed 68  father colon cancer diagnosed at 75 Maternal Aunt breast ca\par  Ashkenazi: no\par  BRCA testing: no\par   [de-identified] : Patient seen and examined for routine follow up for breast cancer. Remains on Anastrazole. Continues to go to [facility] for half the year leaving soon. Feels well. Started Prolia at last visit. Will miss her next visit as she is not returning from [facility] until 7/2025. Continues to follow with Dr. Harden. s/p eval with dr. Chaney for back pain and going for injection. s/p MRI L spine neg for met disease.  Mammo/Sono: 11/11/24 BIRADS 2  DEXA: 8/2024 CNY: 11/6/23

## 2024-12-06 NOTE — HISTORY OF PRESENT ILLNESS
[de-identified] : 72-year-old postmenopausal woman with PMH hyperthyroidism, HTN, HLD, DM that presents for initial consultation for newly diagnosed ER/UT+ HER2 - breast ca. \par  \par  Oncological History: \par  \par  hx of benign breast bx in past about 10 years ago R sided \par  \par  10/7/22 s/p a screening mammogram revealing focal asymmetry in the right breast. The patient will be recalled for further evaluation with additional mammographic views and possible targeted ultrasound of the right breast.\par  \par  22 s/p diagnostic mammo/sono which showed focal right breast asymmetry, spiculated suspicious mass seen at the right breast 10 o'clock position, 6 cm from the nipple on ultrasound as a 10 mm hypoechoic irregular mass with posterior shadowing, BI-RADS 4.  \par  \par  22 s/p right ultrasound-guided core biopsy which revealed a moderately differentiated invasive ductal carcinoma measuring 10 mm that is ER/UT positive HER2 FISH negative with a Ki-67 of 10 to 15%.\par   \par  Mammprint low risk \par  \par  22 s/p MRI revealing  Recently diagnosed right breast malignancy at right breast 10:00. 6 mm indeterminate enhancing mass in the right lower outer breast. MRI guided biopsy is recommended for further evaluation. No suspicious enhancement in the left breast.\par  \par  1/3/23 MRI guided core bx R path revealing fragment of radial sclerosing lesion with intraductal papillomas usual ductal hyperplasia, cystic apocrine metaplasia measuring <4mm in greatest dimension stromal fibrosis and microcysts\par  \par  23 s/p R lumpectomy pathology revealing: \par  Tumor Site: Clock position;  Distance from nipple (Centimeters) 6 cm\par  10 o'clock\par  Histologic Type: Invasive carcinoma of no special type (ductal)\par  Histologic Grade (Fiordaliza Histologic Score):\par  Glandular (Acinar) / Tubular Differentiation: Score 3\par  Nuclear Pleomorphism: Score 2\par  Mitotic Rate: Score 1\par  Overall Grade: Grade 2 (scores of 6 or 7)\par  Tumor Size: 10 Millimeters (mm) (based on prior biopsy; [id number])\par  Tumor Focality: Single focus of invasive carcinoma\par  Ductal Carcinoma In Situ (DCIS): Present\par  Tumor Size: 3 Millimeters (mm)\par  Number of Blocks with DCIS: 5\par  Number of Blocks Examined: 36\par  Architectural Patterns: Cribriform;  Solid and cribriform\par  Nuclear Grade: Grade I- II\par  Necrosis: Present, focal (small foci or single cell necrosis)\par  Lobular Carcinoma In Situ (LCIS): Not identified\par  Lymphovascular Invasion: Not identified\par  Dermal Lymphovascular Invasion: No skin present\par  Microcalcifications: Present in DCIS;  Present in non-neoplastic tissue\par  Treatment Effect in the Breast: No known presurgical therapy\par  Margin Status for Invasive Carcinoma: All margins negative for invasive carcinoma\par  Distance from Invasive Carcinoma to Closest Margin: Greater than 5 mm\par  Closest Margin(s) to Invasive Carcinoma: Superior; all other margins>10 mm\par  Margin Status for DCIS: All margins negative for DCIS\par  Distance from DCIS to Closest Margin: Greater than 5 mm\par  Closest Margin(s) to DCIS: Superior; all other margins>10 mm\par  Regional Lymph Node Status: All regional lymph nodes negative for tumor\par  Molecular Results on Lymph Nodes (used for pN staging): Molecular studies not\par  performed\par  Total Number of Lymph Nodes Examined (sentinel and non-sentinel): 4\par  Number of [place] Nodes Examined: 4\par  Pathologic Stage Classification (pTNM, AJCC 8th Edition)\par  pT Category: pT1b\par  Regional Lymph Nodes Modifier: (sn): [place] node(s) evaluated.\par  pN Category: pN0\par  Prior Biopsy ([facility] Standard 2.7)\par  A biopsy was performed in-house: [id number]\par  Special Studies\par  Estrogen Receptor (ER) Status: Positive (greater than 10% of cells demonstrate\par  nuclear positivity)\par  Percentage of Cells with Nuclear Positivity: %\par  Progesterone Receptor (PgR) Status: Positive\par  Percentage of Cells with Nuclear Positivity: %\par  HER2 (by immunohistochemistry): Equivocal (Score 2+)\par  HER2 (by in situ hybridization): Negative (not amplified)\par  Ki-67 Percentage of Positive Nuclei: 10-15%\par  \par  Breast Cancer Risk Factors:\par  Menarche: 12\par  Date of LMP: 38\par  Menopause: 38\par  \par  Age at first live birth: 16\par  Nursed: yes 3 mos for each birth \par  Hysterectomy: no\par  Oophorectomy: no\par  OCP: no\par  HRT: no\par  Last pap/pelvic exam: hasn't seen in 3 years \par  Related family history: mother esophageal diagnosed 68  father colon cancer diagnosed at 75 Maternal Aunt breast ca\par  Ashkenazi: no\par  BRCA testing: no\par   [de-identified] : Patient seen and examined for routine follow up for breast cancer. Remains on Anastrazole. Continues to go to [facility] for half the year leaving soon. Feels well. Started Prolia at last visit. Will miss her next visit as she is not returning from [facility] until 7/2025. Continues to follow with Dr. Harden. s/p eval with dr. Chaney for back pain and going for injection. s/p MRI L spine neg for met disease.  Mammo/Sono: 11/11/24 BIRADS 2  DEXA: 8/2024 CNY: 11/6/23

## 2024-12-06 NOTE — REASON FOR VISIT
[Follow-Up Visit] : a follow-up [Family Member] : family member [FreeTextEntry2] : Breast Cancer  [Interpreters_FullName] : Daja Morrissey [Interpreters_Relationshiptopatient] : Daughter  [TWNoteComboBox1] : [place]

## 2024-12-12 NOTE — HEALTH RISK ASSESSMENT
[Good] : ~his/her~  mood as  good [Yes] : Yes [2 - 4 times a month (2 pts)] : 2-4 times a month (2 points) [Never (0 pts)] : Never (0 points) [No] : In the past 12 months have you used drugs other than those required for medical reasons? No [No falls in past year] : Patient reported no falls in the past year [0] : 2) Feeling down, depressed, or hopeless: Not at all (0) [PHQ-2 Negative - No further assessment needed] : PHQ-2 Negative - No further assessment needed [Former] : Former [0-4] : 0-4 [> 15 Years] : > 15 Years [NO] : No [Patient reported mammogram was normal] : Patient reported mammogram was normal [Patient reported bone density results were abnormal] : Patient reported bone density results were abnormal [HIV test declined] : HIV test declined [Hepatitis C test declined] : Hepatitis C test declined [None] : None [With Family] : lives with family [Retired] : retired [Less Than High School] : less than high school [] :  [Feels Safe at Home] : Feels safe at home [Fully functional (bathing, dressing, toileting, transferring, walking, feeding)] : Fully functional (bathing, dressing, toileting, transferring, walking, feeding) [Independent] : managing finances [Some assistance needed] : using transportation [Smoke Detector] : smoke detector [Carbon Monoxide Detector] : carbon monoxide detector [Safety elements used in home] : safety elements used in home [Seat Belt] :  uses seat belt [Sunscreen] : uses sunscreen [Designated Healthcare Proxy] : Designated healthcare proxy [FreeTextEntry1] : back pain [de-identified] : oncologist, psysiatrist  [Audit-CScore] : 2 [de-identified] : housework, moves all day [de-identified] : regular diet [[id number]] : 0 [Change in mental status noted] : No change in mental status noted [Language] : denies difficulty with language [Behavior] : denies difficulty with behavior [Learning/Retaining New Information] : denies difficulty learning/retaining new information [Handling Complex Tasks] : denies difficulty handling complex tasks [Reasoning] : denies difficulty with reasoning [Spatial Ability and Orientation] : denies difficulty with spatial ability and orientation [Sexually Active] : not sexually active [High Risk Behavior] : no high risk behavior [Reports changes in hearing] : Reports no changes in hearing [Reports changes in vision] : Reports no changes in vision [Reports normal functional visual acuity (ie: able to read med bottle)] : Reports poor functional visual acuity.  [Reports changes in dental health] : Reports no changes in dental health [Travel to Developing Areas] : does not  travel to developing areas [TB Exposure] : is not being exposed to tuberculosis [Caregiver Concerns] : does not have caregiver concerns [MammogramComments] : followed by Dr. Harden / lilia garcia [PapSmearComments] : no further screenings [BoneDensityComments] : not due yet [ColonoscopyComments] : due in 2025 [de-identified] : Mini-Cog = 2 [de-identified] : Daja Maciasney [FreeTextEntry3] : Ramesh, lives in [facility] [de-identified] : sees an audiologist [de-identified] : see an ophthalmologist in [facility], had lasix [de-identified] : sees dentist in [facility], sees dermatology in [facility] [FreeTextEntry4] : did hcp in office; will need MOLST done at next visit with pcp.

## 2024-12-12 NOTE — PLAN
[FreeTextEntry1] : needs shingrex - will wait to get this when returns next year flu done today needs [place] discussion - will make appt with pcp completed hcp today Metformin -taking only 2 tablets daily (not bid)  renewed all medications for 6 month supply going to [facility] until spring mini-cog shows cognitive decline 2/5

## 2025-07-29 NOTE — HISTORY OF PRESENT ILLNESS
[FreeTextEntry1] : 1/23 right partial mastectomy x2 with sentinel node biopsy Moderately differentiated invasive ductal carcinoma, 10 mm, ER/MO positive HER2 negative with a Ki-67 of 10 to 15%, 0/4 nodes T1b N0 M0, stage Ia; MammaPrint revealed low risk luminal: FLEX trial Anastrozole (no radiation recommended)  Patient denies any breast masses or nipple discharge.  Patient has no breast pain.  Patient is taking and tolerating her anastrozole.  Last November there was a mammogram and ultrasound that were unremarkable, BI-RADS 2.  Patient has lower back pain with osteoarthritis that is going to get steroid injected soon.  72-year-old postmenopausal woman had a screening mammogram which showed focal right breast asymmetry, spiculated suspicious mass seen at the right breast 10 o'clock position, 6 cm from the nipple on ultrasound as a 10 mm hypoechoic irregular mass with posterior shadowing, BI-RADS 4.  Patient underwent a right ultrasound-guided core biopsy which revealed a moderately differentiated invasive ductal carcinoma measuring 10 mm that is ER/MO positive HER2 FISH negative with a Ki-67 of 10 to 15%.  Patient denies any breast masses or bone pain.  This was the patient's second breast biopsy with a benign excisional biopsy years ago.  Patient has no family history of breast cancer and has never taken hormone replacement therapy.

## 2025-07-29 NOTE — HISTORY OF PRESENT ILLNESS
[Back Pain] : back pain [___ yrs] : [unfilled] year(s) ago [Episodic] : episodic [8] : a maximum pain level of 8/10 [Aching] : aching [Transitioning] : transitioning [Medications] : medications [Rest] : rest [FreeTextEntry1] : Patient was provided option of utilizing  services, but patient declined and would like to proceed with visit by using interpretation assistance from family member/staff.  Interval Note:  sp  BILATERAL L4-sacral ala diagnostic medial branch block (2 joints, 3 nerves on each side) 100% improvement in pain for 12 hours.  Since last visit the pain is improved. Denies any additional weakness, numbness, bowel/bladder dysfunction.  Severe back pain, she rosemary to [facility] to visit family.   HPI     Ms. ORLANDO RENDON is a 75 year F on baby ASA with pmhx of breast cancer (2022- s/p lumpectomy and hormone therapy). HTN, HLD. Complaints of worsening lower back pain with transitions. Pain worst in the morning. PRN Tylenol with no effect. Does not wish to take stronger medications. Pain is impacting her quality of life and ability to perform ADL's. Denies any additional weakness, numbness, bowel/bladder dysfunction, history of bleeding disorders.    Previous and current pain medications/doses/effects: Tylenol      Previous Pain Treatments:     Previous Pain Injections:   BILATERAL L4-sacral ala diagnostic medial branch block (2 joints, 3 nerves on each side) 12/4/24  BILATERAL L4-sacral ala diagnostic medial branch block (2 joints, 3 nerves on each side) 11/15/24  Previous Diagnostic Studies/Images: 8/13/2024 View Order (Report matches exam selected in Patient History pane)  PROCEDURE: MRI LUMBAR SPINE WAW IC  ORDER #: [id number] CC: ; ; ; End of cc's  CLINICAL INFORMATION: Back pain, breast cancer  ADDITIONAL CLINICAL INFORMATION: Cancer C80.1  TECHNIQUE: Multiplanar, multisequence MRI was performed of the lumbar spine. IV Contrast: Gadavist 7 cc administered 3 cc discarded  PRIOR STUDIES: No priors available for comparison.  FINDINGS:  LOCALIZER: No additional findings. BONES: There is no fracture or bone marrow edema. There is no marrow replacing process or abnormal enhancement within the lumbar spine to suggest sequelae of metastatic disease. ALIGNMENT: The alignment is normal. SACROILIAC JOINTS/SACRUM: There is no sacral fracture. The SI joints are partially visualized but are intact. CONUS AND CAUDA EQUINA: The distal cord and conus are normal in signal. Conus terminates at L1. VISUALIZED INTRAPELVIC/INTRA-ABDOMINAL SOFT TISSUES: Normal. PARASPINAL SOFT TISSUES: Normal.   INDIVIDUAL LEVELS:  LOWER THORACIC SPINE: No spinal canal or neuroforaminal stenosis.  L1-L2: Mild bilateral facet arthropathy. No spinal canal or neuroforaminal stenosis. L2-L3: Mild bilateral facet arthropathy. No spinal canal or neuroforaminal stenosis. L3-L4: Small posterior disc bulge and moderate bilateral facet arthropathy result in mild bilateral neural foraminal narrowing but no significant central canal narrowing. L4-L5: Minimal posterior disc bulge and moderate bilateral facet arthropathy result in mild bilateral neural foraminal narrowing but no significant central canal narrowing. L5-S1: Posterior osseous ridging and moderate bilateral facet arthropathy result in mild bilateral neural foraminal narrowing but no significant central canal narrowing.   IMPRESSION:  No MRI evidence for metastatic disease to the lumbar spine.  Multilevel discogenic degenerative disease and facet arthropathy of the lumbar spine, most pronounced at L3-L4, L4-L5 and L5-S1 where there is mild bilateral neural foraminal narrowing. No additional areas of significant central canal or neural foraminal narrowing with lumbar spine.   ====================================================================================== 8/4/2024 View Order (Report matches exam selected in Patient History pane)  PROCEDURE: CT LUMBAR SPINE  ORDER #: [id number] CC: ; ; ; End of cc's  EXAMINATION: CT LUMBAR SPINE  CLINICAL INDICATION: back pain TECHNIQUE: Thin section CT images were obtained without contrast. Coronal and sagittal reconstructions were performed. Dose reduction techniques were utilized including but not limited to automated exposure control (AEC), iterative reconstruction technique, and/or mA and/or kV dose adjustments based on patient size. COMPARISON: Abdomen and pelvis CT 1/27/2024.  FINDINGS:  There is bridging osteophyte at L5-S1, with associated ankylosis across the disc space. There is diffuse osteopenia, with mild discogenic endplate marrow changes at L1-2. There is a broad-based disc bulge with mild soft tissue encroachment on the central canal and neural foramina at L4-5. There is mild to moderate osteophytic encroachment on the left L5-S1 foramen.  There are degenerative changes in the sacroiliac joints. There are calyceal tip stones in the mid left kidney. There is a calcified granuloma and diffuse fatty replacement in the right lobe of liver. All of these findings are stable.  No acute fractures identified. No prevertebral soft tissue swelling. The facets show no evidence of dislocation or perching. No evidence of a hematoma or edema in the paraspinal musculature and soft tissues. Vertebral body height is intact throughout. MRI would be more sensitive to soft tissue encroachment on neural elements, ligamentous injury, subdural/epidural hemorrhage, and cord contusion.   IMPRESSION:  1. No acute fractures identified.       [FreeTextEntry7] : lower back pain

## 2025-07-29 NOTE — ASSESSMENT
[FreeTextEntry1] : >> Imaging and Other Studies   I personally reviewed the relevant imaging.  Discussed and explained to patient the likely source of pathology and pain.  Questions answered. MRI CT  >> Therapy and Other Modalities  PT   >> Medications  acetaminophen 650mg q8h prn pain (caution <3g daily)   >> Interventions   Significant component of axial back pain secondary to lumbar spondylosis and facet arthropathy demonstrated on MRI LS without significant alternative pertinent findings, PE exam findings of + facet tenderness and facet loading pain.  Pain refractory to conservative treatments.  sp BILATERAL L4-sacral ala diagnostic medial branch block (2 joints, 3 nerves on each side) with 12 hours of improvement 100% x 2  Given significant relief from diagnostic lumbar medial branch block of >80%, and significant improvement in function (as measured by ELIAS) and continued lumbar facet arthropathy pain (demonstrated on imaging) and axial back pain, will schedule LEFT AND RIGHT  L4-sacral ala medial branch (2 joints, 3 nerves on each side) radiofrequency ablation at 80C for 2:30 min r/b/a discussed   >> Consults   na  >> Discussion of Risks/Benefits/Alternatives    >Regarding any scheduled procedures:   In the event the patient is scheduled for a procedure,   I have discussed in detail with the patient that any interventional pain procedure is associated with potential risks.  The procedure may include an injection of steroids and potentially other medications (local anesthetic and normal saline) into the epidural space or surrounding tissue of the spine.  There are significant risks of this procedure which include and are not limited to infection, bleeding, worsening pain, dural puncture leading to postdural puncture headache, nerve damage, spinal cord injury, paralysis, stroke, and death.   There is a chance that the procedure does not improve their pain.   There are risks associated with the steroid being absorbed into the body systemically.  These include dysphoria, difficulty sleeping, mood swings and personality changes.  Premenopausal women may notice an irregularity in her menstrual cycle for 2-3 months following the injection.  Steroids can specifically affect patients with hypertension, diabetes, and peptic ulcers.  The procedure may cause a temporary increase in blood pressure and blood pressure, and may adversely affect a peptic ulcer.  Other, more rare complications, include avascular necrosis of joints, glaucoma and worsening of osteoporosis.   I have discussed the risks of the procedure at length with the patient, and the potential benefits of pain relief.  I have offered alternatives to the procedure.  All questions were answered.   The patient expressed understanding and wishes to proceed with the procedure.   > Longitudinal management of Complex Painful condition   The patient is being managed for a complex condition that requires ongoing management.  The nature of this condition demands nuanced approach to treatment.  The seriousness of the condition necessitates an in-depth and focused approach to management and coordination with other healthcare professionals.    This visit involves intricate evaluation and management of the patient's condition.  The complexity of the visit was due to the need for detailed assessment of the current state, consideration of potential complications and a careful balancing of treatment options to management the chronic condition effectively.   As detailed above, the patient has a chronic significant painful condition that requires regular and detailed management.  The condition's impact on the patient's quality of life and health is substantial and necessitates a comprehensive and tailored approach   >> Conclusion   There were no barriers to communication. Informed patient that I would be available for any additional questions. Patient was instructed to call with any worsening symptoms including severe pain, new numbness/weakness, or changes in the bowel/bladder function. Discussed role of nsaids in pain management and all relevant risks, if patient is continuing to require after 4 weeks the patient should f/u for alternative treatment. Instructed patient to maintain pain diary to monitor pain level, mobility, and function.

## 2025-07-30 NOTE — REASON FOR VISIT
[Follow-Up Visit] : a follow-up [Family Member] : family member [FreeTextEntry2] : Breast Cancer  [Interpreters_FullName] : Daja Morrissey [Interpreters_Relationshiptopatient] : Daughter  [TWNoteComboBox1] : [place]

## 2025-07-30 NOTE — PHYSICAL EXAM
[Fully active, able to carry on all pre-disease performance without restriction] : Status 0 - Fully active, able to carry on all pre-disease performance without restriction [Normal] : affect appropriate [de-identified] : R breast density R breast lumpectomy and SLNbx scar well healed. No masses or LAD bilaterally, seeing Dr Harden today as well

## 2025-07-30 NOTE — REASON FOR VISIT
[Follow-Up Visit] : a follow-up [Family Member] : family member [FreeTextEntry2] : Breast Cancer  [Interpreters_FullName] : Daja Morrissey [Interpreters_Relationshiptopatient] : Daughter  [TWNoteComboBox1] : French

## 2025-07-30 NOTE — PHYSICAL EXAM
[Fully active, able to carry on all pre-disease performance without restriction] : Status 0 - Fully active, able to carry on all pre-disease performance without restriction [Normal] : affect appropriate [de-identified] : R breast density R breast lumpectomy and SLNbx scar well healed. No masses or LAD bilaterally, seeing Dr Harden today as well

## 2025-07-30 NOTE — HISTORY OF PRESENT ILLNESS
[de-identified] : 72-year-old postmenopausal woman with PMH hyperthyroidism, HTN, HLD, DM that presents for initial consultation for newly diagnosed ER/KS+ HER2 - breast ca. \par  \par  Oncological History: \par  \par  hx of benign breast bx in past about 10 years ago R sided \par  \par  10/7/22 s/p a screening mammogram revealing focal asymmetry in the right breast. The patient will be recalled for further evaluation with additional mammographic views and possible targeted ultrasound of the right breast.\par  \par  22 s/p diagnostic mammo/sono which showed focal right breast asymmetry, spiculated suspicious mass seen at the right breast 10 o'clock position, 6 cm from the nipple on ultrasound as a 10 mm hypoechoic irregular mass with posterior shadowing, BI-RADS 4.  \par  \par  22 s/p right ultrasound-guided core biopsy which revealed a moderately differentiated invasive ductal carcinoma measuring 10 mm that is ER/KS positive HER2 FISH negative with a Ki-67 of 10 to 15%.\par   \par  Mammprint low risk \par  \par  22 s/p MRI revealing  Recently diagnosed right breast malignancy at right breast 10:00. 6 mm indeterminate enhancing mass in the right lower outer breast. MRI guided biopsy is recommended for further evaluation. No suspicious enhancement in the left breast.\par  \par  1/3/23 MRI guided core bx R path revealing fragment of radial sclerosing lesion with intraductal papillomas usual ductal hyperplasia, cystic apocrine metaplasia measuring <4mm in greatest dimension stromal fibrosis and microcysts\par  \par  23 s/p R lumpectomy pathology revealing: \par  Tumor Site: Clock position;  Distance from nipple (Centimeters) 6 cm\par  10 o'clock\par  Histologic Type: Invasive carcinoma of no special type (ductal)\par  Histologic Grade (Fiordaliza Histologic Score):\par  Glandular (Acinar) / Tubular Differentiation: Score 3\par  Nuclear Pleomorphism: Score 2\par  Mitotic Rate: Score 1\par  Overall Grade: Grade 2 (scores of 6 or 7)\par  Tumor Size: 10 Millimeters (mm) (based on prior biopsy; [id number])\par  Tumor Focality: Single focus of invasive carcinoma\par  Ductal Carcinoma In Situ (DCIS): Present\par  Tumor Size: 3 Millimeters (mm)\par  Number of Blocks with DCIS: 5\par  Number of Blocks Examined: 36\par  Architectural Patterns: Cribriform;  Solid and cribriform\par  Nuclear Grade: Grade I- II\par  Necrosis: Present, focal (small foci or single cell necrosis)\par  Lobular Carcinoma In Situ (LCIS): Not identified\par  Lymphovascular Invasion: Not identified\par  Dermal Lymphovascular Invasion: No skin present\par  Microcalcifications: Present in DCIS;  Present in non-neoplastic tissue\par  Treatment Effect in the Breast: No known presurgical therapy\par  Margin Status for Invasive Carcinoma: All margins negative for invasive carcinoma\par  Distance from Invasive Carcinoma to Closest Margin: Greater than 5 mm\par  Closest Margin(s) to Invasive Carcinoma: Superior; all other margins>10 mm\par  Margin Status for DCIS: All margins negative for DCIS\par  Distance from DCIS to Closest Margin: Greater than 5 mm\par  Closest Margin(s) to DCIS: Superior; all other margins>10 mm\par  Regional Lymph Node Status: All regional lymph nodes negative for tumor\par  Molecular Results on Lymph Nodes (used for pN staging): Molecular studies not\par  performed\par  Total Number of Lymph Nodes Examined (sentinel and non-sentinel): 4\par  Number of Deep Gap Nodes Examined: 4\par  Pathologic Stage Classification (pTNM, AJCC 8th Edition)\par  pT Category: pT1b\par  Regional Lymph Nodes Modifier: (sn): Deep Gap node(s) evaluated.\par  pN Category: pN0\par  Prior Biopsy ([facility] Standard 2.7)\par  A biopsy was performed in-house: [id number]\par  Special Studies\par  Estrogen Receptor (ER) Status: Positive (greater than 10% of cells demonstrate\par  nuclear positivity)\par  Percentage of Cells with Nuclear Positivity: %\par  Progesterone Receptor (PgR) Status: Positive\par  Percentage of Cells with Nuclear Positivity: %\par  HER2 (by immunohistochemistry): Equivocal (Score 2+)\par  HER2 (by in situ hybridization): Negative (not amplified)\par  Ki-67 Percentage of Positive Nuclei: 10-15%\par  \par  Breast Cancer Risk Factors:\par  Menarche: 12\par  Date of LMP: 38\par  Menopause: 38\par  \par  Age at first live birth: 16\par  Nursed: yes 3 mos for each birth \par  Hysterectomy: no\par  Oophorectomy: no\par  OCP: no\par  HRT: no\par  Last pap/pelvic exam: hasn't seen in 3 years \par  Related family history: mother esophageal diagnosed 68  father colon cancer diagnosed at 75 Maternal Aunt breast ca\par  Ashkenazi: no\par  BRCA testing: no\par   [de-identified] : Patient seen and examined for routine follow up for breast cancer. Remains on Anastrazole tolerating. Continues to go to [facility] for half the year. Feels well. Started Prolia at last visit and overdue.  Continues to follow with Dr. Harden seeing today. s/p eval with dr. Chaney for back pain and going for injection. s/p MRI L spine neg for met disease. s/p injections and now going for ablation. s/p recent belpheroplasty  Mammo/Sono: 11/11/24 BIRADS 2 due 11/2025 DEXA: 8/2024  CNY: 11/6/23

## 2025-07-30 NOTE — ASSESSMENT
[FreeTextEntry1] : Ms. Mauricio is a 75-year-old postmenopausal woman with PMH hyperthyroidism, HTN, HLD, DM that presents for initial consultation for newly diagnosed ER/KS+ HER2 - breast ca.  #  Moderately differentiated invasive ductal carcinoma measuring 10 mm that is ER/KS positive HER2 FISH negative with a Ki-67 of 10 to 15%. - Reviewed the diagnosis, prognosis and natural history of ER/KS+/HER2- IDC - Reviewed the imaging and path - Reviewed the NCCN guidelines and patient expresses understanding - Mammaprint low risk - no need for chemo - Discussed that she should meet Dr. Devlin again to discuss role of RT after surgery. - We did discuss I would recommend an aromatase inhibitor, Anastrozole, 1mg PO q day for a minimum of 5 years given that her tumor is ER/KS+ and she is post menopausal. We have reviewed the side effects of Anastrozole to include but are not limited to hot flashes, mood swings, arthralgias, bone pain, thinning of the bones including osteopenia/osteoporosis. She will take this with Ca++ 1200 mg PO q day and Vit D 800 IU daily to protect her bone health. - She is now s/p eval with Dr. Devlin with decision not to proceed with RT. Note reviewed. - Started Anastrazole 1mg daily 2/2023 tolerating well without complaints - s/p follow up with Dr. Swann today 5/9/23 seeing again 6/24 but daughter says she is going to pushout. Mammogram/sonogram-11/2023 reviewed - no evidence. need repeat 11/24. - Continue to monitor blood work. vs and CBC reviewed today; WBC 6.64, hgn 5.8, plt 193 - Continue follow up q3 mos with breast examinations. Of note patient will be travelling to [facility] 4/2024 - 10/2024. She will follow up after she returns. Advised to call office immediately with any new or worsening s/s 8/20/24 - vs and labs reviewed. labs drawn in office today. wbc, hgb, plts wnl, LFTs wnl, kidney function wnl. electrolytes wnl. elevated sugar.  Mammo/sono - 11/24.Seeing Dr. Swann in 9/10/24. MRI L spine - no evidence of malignancy. DEXA with Osteopenia - will proceed with prolia today.  12/2024 vs and labs reviewed; Continue anastrazole s/p mammo/sono 11/11/24 BIRADS 2. Continue to follow with Dr. swann. goes to [facility] for half the year will be back 7/2025. Follow then  7/2025 vs and labs reviewed. continue Anastrozole. Seeing Dr. Swann today. Due for mammo/sono 11/2025  #Osteopenia - s/p DEXA 10/2022 revealing osteopenia L spine -2.1, L femoral neck -2.3, L hip -1.8continue ca++ and vit D - Re-advised to continue weight bearing exercise, ca++ and vit D, limit alcohol and maintain healthy BMI - Re-reviewed possibly starting prolia or zometa for bone health. Reviewed side effects and logistics. Of note she is going to [facility] 5/2023 - 10/2023 and will be having dental work. Warned of risk of ONJ and will need to start after clearance by dentist. - 11/14/23 patient was seen by dentist in [facility] and completed dental work. She states she was cleared to start prolia and presents with letter in from dentist in [facility] which will need to be professionally medically translated. Given information to have this done prior to starting Prolia 3/21/24 - due for repeat dexa 10/24. cleared by dentist for prolia - daughter wants it given when she returns from [facility] so she can be monitored 8/20/24 -  DEXA with Osteopenia - will proceed with prolia today. had dental clearance. repeat in 1 year given concern for worsening bone density with anastrozole. rechecking 8/25 12/2204 she will miss next prolia as she will be in [facility] given she is in [facility] for half the year consider switching to reclast at next visit repeat DEXA 8/2025 7/2025 as above will try for reclast today and repeat dexa 8/2025  #HLD - Continue Statin - now following with Dr. Alexandra  #HTN -changed from amlodipine to HCTZ - monitoring bp  #Colitis - s/p colitis episode requiring hospital admission for IV antibiotics at [facility] 4/20/23 - 4/22/23 - s/p follow up with Dr. Lee. Note reviewed 5/8/23. Scheduled for CNY 11/2023. - s/p CNY 11/6/23 with Dr. Lee  - need repeat due to incomplete prep - reminded   #erythrocytosis has not been dx with sleep apnea but does snore EPO wnl, NAMAN 2 - neg cbc wnl today   #Low back pain  MRI L spine - degenterative disc disease. bilateral neural foraminal narrowing. seeing [facility] seeing dr loyd for injection and now doing radioablation   RTC 4 months with CBC with diff, CMP vit D before patient goes to [facility] for half year  Translation and visit provided by patient's daughter in Estonian  case and management discussed with dr elmore

## 2025-07-30 NOTE — ASSESSMENT
[FreeTextEntry1] : Ms. Mauricio is a 75-year-old postmenopausal woman with PMH hyperthyroidism, HTN, HLD, DM that presents for initial consultation for newly diagnosed ER/NC+ HER2 - breast ca.  #  Moderately differentiated invasive ductal carcinoma measuring 10 mm that is ER/NC positive HER2 FISH negative with a Ki-67 of 10 to 15%. - Reviewed the diagnosis, prognosis and natural history of ER/NC+/HER2- IDC - Reviewed the imaging and path - Reviewed the NCCN guidelines and patient expresses understanding - Mammaprint low risk - no need for chemo - Discussed that she should meet Dr. Devlin again to discuss role of RT after surgery. - We did discuss I would recommend an aromatase inhibitor, Anastrozole, 1mg PO q day for a minimum of 5 years given that her tumor is ER/NC+ and she is post menopausal. We have reviewed the side effects of Anastrozole to include but are not limited to hot flashes, mood swings, arthralgias, bone pain, thinning of the bones including osteopenia/osteoporosis. She will take this with Ca++ 1200 mg PO q day and Vit D 800 IU daily to protect her bone health. - She is now s/p eval with Dr. Devlin with decision not to proceed with RT. Note reviewed. - Started Anastrazole 1mg daily 2/2023 tolerating well without complaints - s/p follow up with Dr. Swann today 5/9/23 seeing again 6/24 but daughter says she is going to pushout. Mammogram/sonogram-11/2023 reviewed - no evidence. need repeat 11/24. - Continue to monitor blood work. vs and CBC reviewed today; WBC 6.64, hgn 5.8, plt 193 - Continue follow up q3 mos with breast examinations. Of note patient will be travelling to [facility] 4/2024 - 10/2024. She will follow up after she returns. Advised to call office immediately with any new or worsening s/s 8/20/24 - vs and labs reviewed. labs drawn in office today. wbc, hgb, plts wnl, LFTs wnl, kidney function wnl. electrolytes wnl. elevated sugar.  Mammo/sono - 11/24.Seeing Dr. Swann in 9/10/24. MRI L spine - no evidence of malignancy. DEXA with Osteopenia - will proceed with prolia today.  12/2024 vs and labs reviewed; Continue anastrazole s/p mammo/sono 11/11/24 BIRADS 2. Continue to follow with Dr. swann. goes to [facility] for half the year will be back 7/2025. Follow then  7/2025 vs and labs reviewed. continue Anastrozole. Seeing Dr. Swann today. Due for mammo/sono 11/2025  #Osteopenia - s/p DEXA 10/2022 revealing osteopenia L spine -2.1, L femoral neck -2.3, L hip -1.8continue ca++ and vit D - Re-advised to continue weight bearing exercise, ca++ and vit D, limit alcohol and maintain healthy BMI - Re-reviewed possibly starting prolia or zometa for bone health. Reviewed side effects and logistics. Of note she is going to [facility] 5/2023 - 10/2023 and will be having dental work. Warned of risk of ONJ and will need to start after clearance by dentist. - 11/14/23 patient was seen by dentist in [facility] and completed dental work. She states she was cleared to start prolia and presents with letter in from dentist in [facility] which will need to be professionally medically translated. Given information to have this done prior to starting Prolia 3/21/24 - due for repeat dexa 10/24. cleared by dentist for prolia - daughter wants it given when she returns from [facility] so she can be monitored 8/20/24 -  DEXA with Osteopenia - will proceed with prolia today. had dental clearance. repeat in 1 year given concern for worsening bone density with anastrozole. rechecking 8/25 12/2204 she will miss next prolia as she will be in [facility] given she is in [facility] for half the year consider switching to reclast at next visit repeat DEXA 8/2025 7/2025 as above will try for reclast today and repeat dexa 8/2025  #HLD - Continue Statin - now following with Dr. Alexandra  #HTN -changed from amlodipine to HCTZ - monitoring bp  #Colitis - s/p colitis episode requiring hospital admission for IV antibiotics at [facility] 4/20/23 - 4/22/23 - s/p follow up with Dr. Lee. Note reviewed 5/8/23. Scheduled for CNY 11/2023. - s/p CNY 11/6/23 with Dr. Lee  - need repeat due to incomplete prep - reminded   #erythrocytosis has not been dx with sleep apnea but does snore EPO wnl, NAMAN 2 - neg cbc wnl today   #Low back pain  MRI L spine - degenterative disc disease. bilateral neural foraminal narrowing. seeing [facility] seeing dr loyd for injection and now doing radioablation   RTC 4 months with CBC with diff, CMP vit D before patient goes to [facility] for half year  Translation and visit provided by patient's daughter in Maltese  case and management discussed with dr elmore

## 2025-07-30 NOTE — HISTORY OF PRESENT ILLNESS
[de-identified] : 72-year-old postmenopausal woman with PMH hyperthyroidism, HTN, HLD, DM that presents for initial consultation for newly diagnosed ER/VA+ HER2 - breast ca. \par  \par  Oncological History: \par  \par  hx of benign breast bx in past about 10 years ago R sided \par  \par  10/7/22 s/p a screening mammogram revealing focal asymmetry in the right breast. The patient will be recalled for further evaluation with additional mammographic views and possible targeted ultrasound of the right breast.\par  \par  22 s/p diagnostic mammo/sono which showed focal right breast asymmetry, spiculated suspicious mass seen at the right breast 10 o'clock position, 6 cm from the nipple on ultrasound as a 10 mm hypoechoic irregular mass with posterior shadowing, BI-RADS 4.  \par  \par  22 s/p right ultrasound-guided core biopsy which revealed a moderately differentiated invasive ductal carcinoma measuring 10 mm that is ER/VA positive HER2 FISH negative with a Ki-67 of 10 to 15%.\par   \par  Mammprint low risk \par  \par  22 s/p MRI revealing  Recently diagnosed right breast malignancy at right breast 10:00. 6 mm indeterminate enhancing mass in the right lower outer breast. MRI guided biopsy is recommended for further evaluation. No suspicious enhancement in the left breast.\par  \par  1/3/23 MRI guided core bx R path revealing fragment of radial sclerosing lesion with intraductal papillomas usual ductal hyperplasia, cystic apocrine metaplasia measuring <4mm in greatest dimension stromal fibrosis and microcysts\par  \par  23 s/p R lumpectomy pathology revealing: \par  Tumor Site: Clock position;  Distance from nipple (Centimeters) 6 cm\par  10 o'clock\par  Histologic Type: Invasive carcinoma of no special type (ductal)\par  Histologic Grade (Fiordaliza Histologic Score):\par  Glandular (Acinar) / Tubular Differentiation: Score 3\par  Nuclear Pleomorphism: Score 2\par  Mitotic Rate: Score 1\par  Overall Grade: Grade 2 (scores of 6 or 7)\par  Tumor Size: 10 Millimeters (mm) (based on prior biopsy; [id number])\par  Tumor Focality: Single focus of invasive carcinoma\par  Ductal Carcinoma In Situ (DCIS): Present\par  Tumor Size: 3 Millimeters (mm)\par  Number of Blocks with DCIS: 5\par  Number of Blocks Examined: 36\par  Architectural Patterns: Cribriform;  Solid and cribriform\par  Nuclear Grade: Grade I- II\par  Necrosis: Present, focal (small foci or single cell necrosis)\par  Lobular Carcinoma In Situ (LCIS): Not identified\par  Lymphovascular Invasion: Not identified\par  Dermal Lymphovascular Invasion: No skin present\par  Microcalcifications: Present in DCIS;  Present in non-neoplastic tissue\par  Treatment Effect in the Breast: No known presurgical therapy\par  Margin Status for Invasive Carcinoma: All margins negative for invasive carcinoma\par  Distance from Invasive Carcinoma to Closest Margin: Greater than 5 mm\par  Closest Margin(s) to Invasive Carcinoma: Superior; all other margins>10 mm\par  Margin Status for DCIS: All margins negative for DCIS\par  Distance from DCIS to Closest Margin: Greater than 5 mm\par  Closest Margin(s) to DCIS: Superior; all other margins>10 mm\par  Regional Lymph Node Status: All regional lymph nodes negative for tumor\par  Molecular Results on Lymph Nodes (used for pN staging): Molecular studies not\par  performed\par  Total Number of Lymph Nodes Examined (sentinel and non-sentinel): 4\par  Number of Bayard Nodes Examined: 4\par  Pathologic Stage Classification (pTNM, AJCC 8th Edition)\par  pT Category: pT1b\par  Regional Lymph Nodes Modifier: (sn): Bayard node(s) evaluated.\par  pN Category: pN0\par  Prior Biopsy ([facility] Standard 2.7)\par  A biopsy was performed in-house: [id number]\par  Special Studies\par  Estrogen Receptor (ER) Status: Positive (greater than 10% of cells demonstrate\par  nuclear positivity)\par  Percentage of Cells with Nuclear Positivity: %\par  Progesterone Receptor (PgR) Status: Positive\par  Percentage of Cells with Nuclear Positivity: %\par  HER2 (by immunohistochemistry): Equivocal (Score 2+)\par  HER2 (by in situ hybridization): Negative (not amplified)\par  Ki-67 Percentage of Positive Nuclei: 10-15%\par  \par  Breast Cancer Risk Factors:\par  Menarche: 12\par  Date of LMP: 38\par  Menopause: 38\par  \par  Age at first live birth: 16\par  Nursed: yes 3 mos for each birth \par  Hysterectomy: no\par  Oophorectomy: no\par  OCP: no\par  HRT: no\par  Last pap/pelvic exam: hasn't seen in 3 years \par  Related family history: mother esophageal diagnosed 68  father colon cancer diagnosed at 75 Maternal Aunt breast ca\par  Ashkenazi: no\par  BRCA testing: no\par   [de-identified] : Patient seen and examined for routine follow up for breast cancer. Remains on Anastrazole tolerating. Continues to go to [facility] for half the year. Feels well. Started Prolia at last visit and overdue.  Continues to follow with Dr. Harden seeing today. s/p eval with dr. Chaney for back pain and going for injection. s/p MRI L spine neg for met disease. s/p injections and now going for ablation. s/p recent belpheroplasty  Mammo/Sono: 11/11/24 BIRADS 2 due 11/2025 DEXA: 8/2024  CNY: 11/6/23